# Patient Record
Sex: FEMALE | URBAN - METROPOLITAN AREA
[De-identification: names, ages, dates, MRNs, and addresses within clinical notes are randomized per-mention and may not be internally consistent; named-entity substitution may affect disease eponyms.]

---

## 2024-03-08 ENCOUNTER — APPOINTMENT (RX ONLY)
Dept: URBAN - METROPOLITAN AREA CLINIC 48 | Facility: CLINIC | Age: 44
Setting detail: DERMATOLOGY
End: 2024-03-08

## 2024-03-08 DIAGNOSIS — Z41.9 ENCOUNTER FOR PROCEDURE FOR PURPOSES OTHER THAN REMEDYING HEALTH STATE, UNSPECIFIED: ICD-10-CM

## 2024-03-08 PROCEDURE — ? FILLERS

## 2024-03-08 ASSESSMENT — LOCATION DETAILED DESCRIPTION DERM
LOCATION DETAILED: RIGHT INFERIOR VERMILION LIP
LOCATION DETAILED: LEFT SUPERIOR VERMILION LIP
LOCATION DETAILED: RIGHT SUPERIOR VERMILION LIP
LOCATION DETAILED: LEFT INFERIOR VERMILION LIP

## 2024-03-08 ASSESSMENT — LOCATION ZONE DERM: LOCATION ZONE: LIP

## 2024-03-08 ASSESSMENT — LOCATION SIMPLE DESCRIPTION DERM
LOCATION SIMPLE: RIGHT LIP
LOCATION SIMPLE: LEFT LIP

## 2024-03-08 NOTE — PROCEDURE: FILLERS
Cheeks Filler Volume In Cc: 0
Include Cannula Information In Note?: No
Filler: Juvederm Ultra Plus
Include Documentation That Aspiration Was Performed Prior To Injecting Filler:: Yes
Consent: Written consent obtained. Risks include but not limited to bruising, beading, irregular texture, ulceration, infection, allergic reaction, scar formation, incomplete augmentation, temporary nature, procedural pain.
Additional Area 1 Location: top lip
Post-Care Instructions: Patient instructed to apply ice to reduce swelling.
Include Cannula Length?: 1.5 inch
Filler Comments: Good capillary refill after procedure
Aspiration Statement: Aspiration was performed prior to injecting site with filler.
Vermilion Lips Filler Volume In Cc: 0.5
Lot #: [id number]
Detail Level: Detailed
Expiration Date (Month Year): 2024-09-19
Lot #: [id number]
Lot #: [id number]
Expiration Date (Month Year): 2024-11-18
Additional Area 1 Location: left cheek
Expiration Date (Month Year): 2024-09-18
Additional Area 2 Location: right cheek
Map Statment: See Attach Map for Complete Details

## 2025-04-03 ENCOUNTER — APPOINTMENT (OUTPATIENT)
Dept: URBAN - METROPOLITAN AREA CLINIC 48 | Facility: CLINIC | Age: 45
Setting detail: DERMATOLOGY
End: 2025-04-03

## 2025-04-03 DIAGNOSIS — Z41.9 ENCOUNTER FOR PROCEDURE FOR PURPOSES OTHER THAN REMEDYING HEALTH STATE, UNSPECIFIED: ICD-10-CM

## 2025-04-03 PROCEDURE — ? FILLERS

## 2025-04-03 ASSESSMENT — LOCATION SIMPLE DESCRIPTION DERM: LOCATION SIMPLE: RIGHT LIP

## 2025-04-03 ASSESSMENT — LOCATION ZONE DERM: LOCATION ZONE: LIP

## 2025-04-03 ASSESSMENT — LOCATION DETAILED DESCRIPTION DERM: LOCATION DETAILED: RIGHT SUPERIOR VERMILION LIP

## 2025-04-03 NOTE — PROCEDURE: FILLERS
Jawline Filler Volume In Cc: 0
Include Cannula Information In Note?: No
Additional Area 1 Location: lips
Lot #: [id number]
Lot #: [id number]
Include Cannula Size?: 22G
Filler Comments: Good capillary refill on exam after procedure 
Consent: Written consent obtained. Risks include but not limited to bruising, beading, irregular texture, ulceration, infection, allergic reaction, scar formation, incomplete augmentation, temporary nature, procedural pain.
Post-Care Instructions: Patient instructed to apply ice to reduce swelling.
Expiration Date (Month Year): 03/19/2026
Additional Area 2 Location: perioral
Use Map Statement For Sites (Optional): Yes
Expiration Date (Month Year): 2024-11-10
Include Cannula Length?: 1.5 inch
Additional Area 3 Location: periocular
Map Statment: See Attach Map for Complete Details
Filler: Juvederm Vollure XC
Include Cannula Size?: 23G
Aspiration Statement: Aspiration was performed prior to injecting site with filler.
Lot #: [id number]
Expiration Date (Month Year): 2026-01-03
Vermilion Lips Filler Volume In Cc: 0.5
Detail Level: Detailed